# Patient Record
Sex: FEMALE | Race: BLACK OR AFRICAN AMERICAN | Employment: UNEMPLOYED | URBAN - METROPOLITAN AREA
[De-identification: names, ages, dates, MRNs, and addresses within clinical notes are randomized per-mention and may not be internally consistent; named-entity substitution may affect disease eponyms.]

---

## 2018-11-21 ENCOUNTER — APPOINTMENT (OUTPATIENT)
Dept: PHYSICAL THERAPY | Age: 38
End: 2018-11-21

## 2018-12-03 ENCOUNTER — HOSPITAL ENCOUNTER (OUTPATIENT)
Dept: PHYSICAL THERAPY | Age: 38
End: 2018-12-03

## 2018-12-04 ENCOUNTER — APPOINTMENT (OUTPATIENT)
Dept: PHYSICAL THERAPY | Age: 38
End: 2018-12-04

## 2019-08-29 ENCOUNTER — OFFICE VISIT (OUTPATIENT)
Dept: OBGYN CLINIC | Age: 39
End: 2019-08-29

## 2019-08-29 VITALS
SYSTOLIC BLOOD PRESSURE: 132 MMHG | DIASTOLIC BLOOD PRESSURE: 84 MMHG | HEART RATE: 81 BPM | RESPIRATION RATE: 20 BRPM | WEIGHT: 255.6 LBS | BODY MASS INDEX: 43.64 KG/M2 | HEIGHT: 64 IN | OXYGEN SATURATION: 100 %

## 2019-08-29 VITALS
WEIGHT: 255.6 LBS | HEART RATE: 81 BPM | HEIGHT: 64 IN | RESPIRATION RATE: 22 BRPM | BODY MASS INDEX: 43.64 KG/M2 | OXYGEN SATURATION: 100 % | DIASTOLIC BLOOD PRESSURE: 84 MMHG | SYSTOLIC BLOOD PRESSURE: 132 MMHG

## 2019-08-29 DIAGNOSIS — E66.01 OBESITY, MORBID (HCC): ICD-10-CM

## 2019-08-29 DIAGNOSIS — Z01.419 ENCOUNTER FOR GYNECOLOGICAL EXAMINATION WITHOUT ABNORMAL FINDING: Primary | ICD-10-CM

## 2019-08-29 DIAGNOSIS — Z01.419 WELL WOMAN EXAM WITH ROUTINE GYNECOLOGICAL EXAM: Primary | ICD-10-CM

## 2019-08-29 LAB
HCG URINE, QL. (POC): NEGATIVE
VALID INTERNAL CONTROL?: YES

## 2019-08-29 RX ORDER — MEDROXYPROGESTERONE ACETATE 150 MG/ML
150 INJECTION, SUSPENSION INTRAMUSCULAR ONCE
Qty: 1 ML | Refills: 3 | Status: SHIPPED | OUTPATIENT
Start: 2019-08-29 | End: 2019-08-29

## 2019-08-29 RX ORDER — ALBUTEROL SULFATE 90 UG/1
AEROSOL, METERED RESPIRATORY (INHALATION)
COMMUNITY

## 2019-08-29 RX ORDER — HYDROCHLOROTHIAZIDE 25 MG/1
25 TABLET ORAL DAILY
COMMUNITY

## 2019-08-29 RX ORDER — BUDESONIDE AND FORMOTEROL FUMARATE DIHYDRATE 160; 4.5 UG/1; UG/1
2 AEROSOL RESPIRATORY (INHALATION) 2 TIMES DAILY
COMMUNITY

## 2019-08-29 NOTE — PROGRESS NOTES
Subjective:   46 y.o. female for Well Woman Check. Patient's last menstrual period was 08/09/2019 (exact date). Social History: single partner, contraception - condoms. Pertinent past medical hstory: hypertension, HIV positive, no history of  DVT, CAD, DM, liver disease, migraines or smoking. Current Outpatient Medications   Medication Sig Dispense Refill    hydroCHLOROthiazide (HYDRODIURIL) 25 mg tablet Take 25 mg by mouth daily.  METOPROLOL TARTRATE PO Take  by mouth.  albuterol (PROVENTIL HFA) 90 mcg/actuation inhaler Take  by inhalation.  budesonide-formoterol (SYMBICORT) 160-4.5 mcg/actuation HFAA Take 2 Puffs by inhalation two (2) times a day.  elvitegravir-cobicistat-emtricitabine-tenofovir alafenamide (GENVOYA) tab tablet Take 1 Tab by mouth daily (with breakfast).  medroxyPROGESTERone (DEPO-PROVERA) 150 mg/mL injection 1 mL by IntraMUSCular route once for 1 dose. 1 mL 3    oxyCODONE-acetaminophen (PERCOCET) 5-325 mg per tablet Take 1 Tab by mouth every four (4) hours as needed (BREAKTHROUGH PAIN ONLY, DO NOT FILL UNLESS PENICILLIN VK AND PERIDEX SOLUTION ARE FILLED.). 12 Tab 0    amLODIPine (NORVASC) 5 mg tablet Take 5 mg by mouth daily. Indications: CHRONIC ANGINA PECTORIS       Allergies   Allergen Reactions    Norvasc [Amlodipine] Hives    Shellfish Derived Hives and Swelling     Past Medical History:   Diagnosis Date    Abnormal Papanicolaou smear of cervix     HIV [facility]human immunodeficiency virus infection) ([facility] [address]     Hypertension      Past Surgical History:   Procedure Laterality Date    HX LEEP PROCEDURE      HX TONSIL AND ADENOIDECTOMY       Family History   Problem Relation Age of Onset    Breast Cancer Maternal Grandmother      Social History     Tobacco Use    Smoking status: Former Smoker     Packs/day: 0.25    Smokeless tobacco: Never Used   Substance Use Topics    Alcohol use: Yes     Comment: socially         ROS:  Feeling well.  No dyspnea or chest pain on exertion. No abdominal pain, change in bowel habits, black or bloody stools. No urinary tract symptoms. GYN ROS: normal menses, no abnormal bleeding, pelvic pain or discharge, no breast pain or new or enlarging lumps on self exam. No neurological complaints. Objective:     Visit Vitals  /84   Pulse 81   Resp 20   Ht 5' 4\" (1.626 m)   Wt 255 lb 9.6 oz (115.9 kg)   LMP 08/09/2019 (Exact Date)   SpO2 100%   BMI 43.87 kg/m²     The patient appears well, alert, oriented x 3, in no distress. ENT normal.  Neck supple. No adenopathy or thyromegaly. MED. Lungs are clear, good air entry, no wheezes, rhonchi or rales. S1 and S2 normal, no murmurs, regular rate and rhythm. Abdomen soft without tenderness, guarding, mass or organomegaly. Extremities show no edema, normal peripheral pulses. Neurological is normal, no focal findings. BREAST EXAM: breasts appear normal, no suspicious masses, no skin or nipple changes or axillary nodes    PELVIC EXAM: normal external genitalia, vulva, vagina, cervix, uterus and adnexa, exam limited by obesity, PAP: Pap smear done today, DNA probe for chlamydia and GC obtained, HPV test    Assessment/Plan:   well woman  STD screening  Contraception, Depo-Provera given  pap smear, needed annually secondary to HIV status  return annually or prn  Plan of care discussed. Patient expressed understanding.

## 2019-08-29 NOTE — PROGRESS NOTES
1. Have you been to the ER, urgent care clinic since your last visit? Hospitalized since your last visit? No    2. Have you seen or consulted any other health care providers outside of the [address] [place] [address] since your last visit? Include any pap smears or colon screening.  No

## 2019-08-29 NOTE — PROGRESS NOTES
Date last pap: today. Last Depo-Provera: 1st depo today. Side Effects if any: none. Serum HCG indicated? none. Depo-Provera 150 mg IM given without complaint   by: karina chisholm cma  . Next appointment due 11-15 thru 11-.

## 2019-08-29 NOTE — PROGRESS NOTES
1. Have you been to the ER, urgent care clinic since your last visit? Hospitalized since your last visit? No    2. Have you seen or consulted any other health care providers outside of the [address] [place] [address] since your last visit? Include any pap smears or colon screening.  No

## 2019-08-30 LAB
CHLAMYDIA TRACHOMATIS THINPREP, 13342: NEGATIVE
NEISSERIA GONORRHOEAE THINPREP, 13343: NEGATIVE
PAP IMAGE GUIDED, 8900296: NORMAL
TRICHOMONAS NUC AMP-THIN PREP,13357: NEGATIVE

## 2019-09-10 ENCOUNTER — TELEPHONE (OUTPATIENT)
Dept: OBGYN CLINIC | Age: 39
End: 2019-09-10

## 2019-09-10 NOTE — TELEPHONE ENCOUNTER
Called this patient to inform her that her STD screening was negative , patient  Voiced understanding .

## 2019-10-03 ENCOUNTER — OFFICE VISIT (OUTPATIENT)
Dept: OBGYN CLINIC | Age: 39
End: 2019-10-03

## 2019-10-03 VITALS
WEIGHT: 257 LBS | DIASTOLIC BLOOD PRESSURE: 85 MMHG | BODY MASS INDEX: 43.87 KG/M2 | SYSTOLIC BLOOD PRESSURE: 145 MMHG | HEART RATE: 69 BPM | HEIGHT: 64 IN | RESPIRATION RATE: 18 BRPM | TEMPERATURE: 98.4 F

## 2019-10-03 DIAGNOSIS — N92.6 IRREGULAR BLEEDING: Primary | ICD-10-CM

## 2019-10-03 NOTE — PROGRESS NOTES
45 y/o G0 with HIV was recently placed on Depo Provera. She states she has a fibroid and was using  It to temporize bleeding. She notes she has been bleeding for 3 months. She denies any other change in medication or other issue. Denies pain or bleeding with intercourse. Active Ambulatory Problems     Diagnosis Date Noted    Obesity, morbid ([facility] [address] 08/29/2019     Resolved Ambulatory Problems     Diagnosis Date Noted    No Resolved Ambulatory Problems     Past Medical History:   Diagnosis Date    Abnormal Papanicolaou smear of cervix     HIV (human immunodeficiency virus infection) ([facility] [address]     Hypertension      Visit Vitals  /85   Pulse 69   Temp 98.4 °F (36.9 °C) (Oral)   Resp 18   Ht 5' 4\" (1.626 m)   Wt 257 lb (116.6 kg)   BMI 44.11 kg/m²     Gen: A&Ox3,NAD  Exam deferred    Pathology: normal pap; HPV not done    A/P  DUB- recent Depo Provera. Discussed common side effects of Depo Provera including bleeding. Recommend menstrual diary and discussed usual resolution of the symptoms. RTO in 3 months if continues. The plan of care has been discussed with the patient. She has been given the opportunity to ask questions, which seem to have been answered satisfactorily. 15 minutes spent with this patient with > 75 % counseling about family planning options and bleeding. Stressed importance for yearly pap smears.

## 2019-10-19 ENCOUNTER — HOSPITAL ENCOUNTER (OUTPATIENT)
Dept: ULTRASOUND IMAGING | Age: 39
Discharge: HOME | End: 2019-10-19
Attending: OBSTETRICS & GYNECOLOGY
Payer: MEDICAID

## 2019-10-19 DIAGNOSIS — N92.6 IRREGULAR BLEEDING: ICD-10-CM

## 2019-10-19 PROCEDURE — 93975 VASCULAR STUDY: CPT

## 2019-10-24 ENCOUNTER — OFFICE VISIT (OUTPATIENT)
Dept: OBGYN CLINIC | Age: 39
End: 2019-10-24

## 2019-10-24 VITALS
DIASTOLIC BLOOD PRESSURE: 93 MMHG | RESPIRATION RATE: 20 BRPM | SYSTOLIC BLOOD PRESSURE: 139 MMHG | BODY MASS INDEX: 44.86 KG/M2 | HEIGHT: 64 IN | HEART RATE: 100 BPM | OXYGEN SATURATION: 99 % | WEIGHT: 262.8 LBS

## 2019-10-24 DIAGNOSIS — N92.6 IRREGULAR BLEEDING: Primary | ICD-10-CM

## 2019-10-24 PROBLEM — Z30.09 FAMILY PLANNING EDUCATION, GUIDANCE, AND COUNSELING: Status: ACTIVE | Noted: 2019-10-24

## 2019-10-24 NOTE — PROGRESS NOTES
45 y/o with irregular bleeding post Depo provera. She notes the bleeding has improved. She was sent for ultrasound to r/o structural cause. Ultrasound resulted as normal.  Discussed the effects of Depo and side effects of weight gain. RTO as scheduled for next dose.

## 2019-10-24 NOTE — PROGRESS NOTES
1. Have you been to the ER, urgent care clinic since your last visit? Hospitalized since your last visit? No    2. Have you seen or consulted any other health care providers outside of the [address] [place] [address] since your last visit? Include any pap smears or colon screening.  No

## 2019-11-25 ENCOUNTER — CLINICAL SUPPORT (OUTPATIENT)
Dept: OBGYN CLINIC | Age: 39
End: 2019-11-25

## 2019-11-25 DIAGNOSIS — Z30.09 FAMILY PLANNING: Primary | ICD-10-CM

## 2019-11-25 LAB
HCG URINE, QL. (POC): NEGATIVE
VALID INTERNAL CONTROL?: YES

## 2019-11-25 NOTE — PROGRESS NOTES
.Date last pap: 8-. Last Depo-Provera: 8-. Side Effects if any: none. Serum HCG indicated? no.  Depo-Provera 150 mg IM given by: dominga Perez.   Next appointment due 2-13- thru 2-27-20.      p-test = negative

## 2020-02-21 ENCOUNTER — CLINICAL SUPPORT (OUTPATIENT)
Dept: OBGYN CLINIC | Age: 40
End: 2020-02-21

## 2020-02-21 VITALS — RESPIRATION RATE: 20 BRPM | HEIGHT: 64 IN | WEIGHT: 262 LBS | OXYGEN SATURATION: 99 % | BODY MASS INDEX: 44.73 KG/M2

## 2020-02-21 DIAGNOSIS — Z30.09 FAMILY PLANNING: Primary | ICD-10-CM

## 2020-02-21 NOTE — PROGRESS NOTES
Date last pap: 08/29/2019  Last Depo-Provera: 11/25/2019  Side Effects if any: none  Serum HCG indicated? UPT NEGATIVE. Depo-Provera 150 mg IM given by: Replaced by [facility]. Next appointment due 05/9/20.

## 2020-05-14 ENCOUNTER — CLINICAL SUPPORT (OUTPATIENT)
Dept: OBGYN CLINIC | Age: 40
End: 2020-05-14

## 2020-05-14 VITALS
DIASTOLIC BLOOD PRESSURE: 84 MMHG | TEMPERATURE: 96.6 F | WEIGHT: 264 LBS | BODY MASS INDEX: 45.07 KG/M2 | HEIGHT: 64 IN | SYSTOLIC BLOOD PRESSURE: 140 MMHG | OXYGEN SATURATION: 98 % | RESPIRATION RATE: 20 BRPM | HEART RATE: 82 BPM

## 2020-05-14 DIAGNOSIS — Z30.09 FAMILY PLANNING: Primary | ICD-10-CM

## 2020-05-14 LAB
HCG URINE, QL. (POC): NEGATIVE
VALID INTERNAL CONTROL?: YES

## 2020-05-14 NOTE — PROGRESS NOTES
Subjective: Shashank Thomas is here for her depoprovera injection. Patient wishes to continue depoprovera treatment for contraception. Side effects of treatment to date: none and weight gain 5 lbs. Standing order is on patient's medication list.    Last Depoprovera injection date: 02/21/20  Last Pap smear date:08//2019    Objective:     Visit Vitals  /84   Pulse 82   Temp 96.6 °F (35.9 °C) (Tympanic)   Resp 20   Ht 5' 4\" (1.626 m)   Wt 264 lb (119.7 kg)   SpO2 98%   BMI 45.32 kg/m²       Assessment/Plan:     Stable, doing well on Depoprovera, appropriate to continue. Depoprovera 150 mg IM given. She tolerated the injection well, see Immunization activity for details.     Cecilio Arzate LPN